# Patient Record
Sex: FEMALE | Race: WHITE | Employment: OTHER | URBAN - METROPOLITAN AREA
[De-identification: names, ages, dates, MRNs, and addresses within clinical notes are randomized per-mention and may not be internally consistent; named-entity substitution may affect disease eponyms.]

---

## 2018-03-16 ENCOUNTER — APPOINTMENT (OUTPATIENT)
Dept: GENERAL RADIOLOGY | Age: 83
DRG: 690 | End: 2018-03-16
Attending: EMERGENCY MEDICINE
Payer: MEDICARE

## 2018-03-16 ENCOUNTER — APPOINTMENT (OUTPATIENT)
Dept: CT IMAGING | Age: 83
DRG: 690 | End: 2018-03-16
Attending: EMERGENCY MEDICINE
Payer: MEDICARE

## 2018-03-16 ENCOUNTER — HOSPITAL ENCOUNTER (INPATIENT)
Age: 83
LOS: 3 days | Discharge: HOME HEALTH | DRG: 690 | End: 2018-03-19
Attending: EMERGENCY MEDICINE | Admitting: HOSPITALIST
Payer: MEDICARE

## 2018-03-16 DIAGNOSIS — N39.0 RECURRENT URINARY TRACT INFECTION: Primary | ICD-10-CM

## 2018-03-16 PROBLEM — E86.0 DEHYDRATION: Status: ACTIVE | Noted: 2018-03-16
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
## 2018-03-16 PROBLEM — R53.81 PHYSICAL DECONDITIONING: Status: ACTIVE | Noted: 2018-03-16

## 2018-03-16 LAB
ALBUMIN SERPL-MCNC: 3.8 G/DL (ref 3.2–4.6)
ALBUMIN/GLOB SERPL: 1 {RATIO} (ref 1.2–3.5)
ALP SERPL-CCNC: 118 U/L (ref 50–136)
ALT SERPL-CCNC: 24 U/L (ref 12–65)
ANION GAP SERPL CALC-SCNC: 9 MMOL/L (ref 7–16)
APPEARANCE UR: ABNORMAL
AST SERPL-CCNC: 17 U/L (ref 15–37)
ATRIAL RATE: 77 BPM
BACTERIA URNS QL MICRO: ABNORMAL /HPF
BASOPHILS # BLD: 0 K/UL (ref 0–0.2)
BASOPHILS NFR BLD: 0 % (ref 0–2)
BILIRUB SERPL-MCNC: 0.8 MG/DL (ref 0.2–1.1)
BILIRUB UR QL: NEGATIVE
BUN SERPL-MCNC: 28 MG/DL (ref 8–23)
CALCIUM SERPL-MCNC: 9.2 MG/DL (ref 8.3–10.4)
CALCULATED P AXIS, ECG09: 59 DEGREES
CALCULATED R AXIS, ECG10: -24 DEGREES
CALCULATED T AXIS, ECG11: 57 DEGREES
CASTS URNS QL MICRO: ABNORMAL /LPF
CHLORIDE SERPL-SCNC: 104 MMOL/L (ref 98–107)
CO2 SERPL-SCNC: 27 MMOL/L (ref 21–32)
COLOR UR: YELLOW
CREAT SERPL-MCNC: 1.13 MG/DL (ref 0.6–1)
DIAGNOSIS, 93000: NORMAL
DIFFERENTIAL METHOD BLD: ABNORMAL
EOSINOPHIL # BLD: 0.2 K/UL (ref 0–0.8)
EOSINOPHIL NFR BLD: 2 % (ref 0.5–7.8)
EPI CELLS #/AREA URNS HPF: ABNORMAL /HPF
ERYTHROCYTE [DISTWIDTH] IN BLOOD BY AUTOMATED COUNT: 13.6 % (ref 11.9–14.6)
GLOBULIN SER CALC-MCNC: 3.9 G/DL (ref 2.3–3.5)
GLUCOSE SERPL-MCNC: 156 MG/DL (ref 65–100)
GLUCOSE UR STRIP.AUTO-MCNC: NEGATIVE MG/DL
HCT VFR BLD AUTO: 48 % (ref 35.8–46.3)
HGB BLD-MCNC: 16.1 G/DL (ref 11.7–15.4)
HGB UR QL STRIP: NEGATIVE
IMM GRANULOCYTES # BLD: 0 K/UL (ref 0–0.5)
IMM GRANULOCYTES NFR BLD AUTO: 0 % (ref 0–5)
KETONES UR QL STRIP.AUTO: NEGATIVE MG/DL
LEUKOCYTE ESTERASE UR QL STRIP.AUTO: ABNORMAL
LYMPHOCYTES # BLD: 2.3 K/UL (ref 0.5–4.6)
LYMPHOCYTES NFR BLD: 23 % (ref 13–44)
MCH RBC QN AUTO: 32.7 PG (ref 26.1–32.9)
MCHC RBC AUTO-ENTMCNC: 33.5 G/DL (ref 31.4–35)
MCV RBC AUTO: 97.4 FL (ref 79.6–97.8)
MONOCYTES # BLD: 0.8 K/UL (ref 0.1–1.3)
MONOCYTES NFR BLD: 8 % (ref 4–12)
NEUTS SEG # BLD: 6.8 K/UL (ref 1.7–8.2)
NEUTS SEG NFR BLD: 67 % (ref 43–78)
NITRITE UR QL STRIP.AUTO: NEGATIVE
P-R INTERVAL, ECG05: 160 MS
PH UR STRIP: 7 [PH] (ref 5–9)
PLATELET # BLD AUTO: 162 K/UL (ref 150–450)
PMV BLD AUTO: 9.5 FL (ref 10.8–14.1)
POTASSIUM SERPL-SCNC: 4.2 MMOL/L (ref 3.5–5.1)
PROT SERPL-MCNC: 7.7 G/DL (ref 6.3–8.2)
PROT UR STRIP-MCNC: NEGATIVE MG/DL
Q-T INTERVAL, ECG07: 378 MS
QRS DURATION, ECG06: 86 MS
QTC CALCULATION (BEZET), ECG08: 427 MS
RBC # BLD AUTO: 4.93 M/UL (ref 4.05–5.25)
RBC #/AREA URNS HPF: ABNORMAL /HPF
SODIUM SERPL-SCNC: 140 MMOL/L (ref 136–145)
SP GR UR REFRACTOMETRY: 1.02 (ref 1–1.02)
TROPONIN I BLD-MCNC: 0.01 NG/ML (ref 0.02–0.05)
UROBILINOGEN UR QL STRIP.AUTO: 0.2 EU/DL (ref 0.2–1)
VENTRICULAR RATE, ECG03: 77 BPM
WBC # BLD AUTO: 10.1 K/UL (ref 4.3–11.1)
WBC URNS QL MICRO: ABNORMAL /HPF

## 2018-03-16 PROCEDURE — 99285 EMERGENCY DEPT VISIT HI MDM: CPT | Performed by: EMERGENCY MEDICINE

## 2018-03-16 PROCEDURE — 85025 COMPLETE CBC W/AUTO DIFF WBC: CPT | Performed by: STUDENT IN AN ORGANIZED HEALTH CARE EDUCATION/TRAINING PROGRAM

## 2018-03-16 PROCEDURE — 74011250637 HC RX REV CODE- 250/637: Performed by: HOSPITALIST

## 2018-03-16 PROCEDURE — 74011250636 HC RX REV CODE- 250/636: Performed by: EMERGENCY MEDICINE

## 2018-03-16 PROCEDURE — 93005 ELECTROCARDIOGRAM TRACING: CPT | Performed by: EMERGENCY MEDICINE

## 2018-03-16 PROCEDURE — 81001 URINALYSIS AUTO W/SCOPE: CPT | Performed by: EMERGENCY MEDICINE

## 2018-03-16 PROCEDURE — 87086 URINE CULTURE/COLONY COUNT: CPT | Performed by: HOSPITALIST

## 2018-03-16 PROCEDURE — 70450 CT HEAD/BRAIN W/O DYE: CPT

## 2018-03-16 PROCEDURE — 74022 RADEX COMPL AQT ABD SERIES: CPT

## 2018-03-16 PROCEDURE — 74011000258 HC RX REV CODE- 258: Performed by: EMERGENCY MEDICINE

## 2018-03-16 PROCEDURE — 96365 THER/PROPH/DIAG IV INF INIT: CPT | Performed by: EMERGENCY MEDICINE

## 2018-03-16 PROCEDURE — 87040 BLOOD CULTURE FOR BACTERIA: CPT | Performed by: EMERGENCY MEDICINE

## 2018-03-16 PROCEDURE — 96361 HYDRATE IV INFUSION ADD-ON: CPT | Performed by: EMERGENCY MEDICINE

## 2018-03-16 PROCEDURE — 74011250636 HC RX REV CODE- 250/636: Performed by: HOSPITALIST

## 2018-03-16 PROCEDURE — 65270000029 HC RM PRIVATE

## 2018-03-16 PROCEDURE — 84484 ASSAY OF TROPONIN QUANT: CPT

## 2018-03-16 PROCEDURE — 80053 COMPREHEN METABOLIC PANEL: CPT | Performed by: STUDENT IN AN ORGANIZED HEALTH CARE EDUCATION/TRAINING PROGRAM

## 2018-03-16 RX ORDER — CEPHALEXIN 500 MG/1
500 CAPSULE ORAL 4 TIMES DAILY
Qty: 28 CAP | Refills: 0 | Status: SHIPPED | OUTPATIENT
Start: 2018-03-16 | End: 2018-03-19

## 2018-03-16 RX ORDER — ADHESIVE BANDAGE
30 BANDAGE TOPICAL DAILY PRN
Status: DISCONTINUED | OUTPATIENT
Start: 2018-03-16 | End: 2018-03-19 | Stop reason: HOSPADM

## 2018-03-16 RX ORDER — NALOXONE HYDROCHLORIDE 0.4 MG/ML
0.4 INJECTION, SOLUTION INTRAMUSCULAR; INTRAVENOUS; SUBCUTANEOUS AS NEEDED
Status: DISCONTINUED | OUTPATIENT
Start: 2018-03-16 | End: 2018-03-19 | Stop reason: HOSPADM

## 2018-03-16 RX ORDER — SODIUM CHLORIDE 9 MG/ML
100 INJECTION, SOLUTION INTRAVENOUS CONTINUOUS
Status: DISPENSED | OUTPATIENT
Start: 2018-03-16 | End: 2018-03-18

## 2018-03-16 RX ORDER — HYDROCODONE BITARTRATE AND ACETAMINOPHEN 5; 325 MG/1; MG/1
1 TABLET ORAL
Status: DISCONTINUED | OUTPATIENT
Start: 2018-03-16 | End: 2018-03-19 | Stop reason: HOSPADM

## 2018-03-16 RX ORDER — METOPROLOL TARTRATE 100 MG/1
100 TABLET ORAL 2 TIMES DAILY
Status: DISCONTINUED | OUTPATIENT
Start: 2018-03-16 | End: 2018-03-19 | Stop reason: HOSPADM

## 2018-03-16 RX ORDER — SODIUM CHLORIDE 0.9 % (FLUSH) 0.9 %
5-10 SYRINGE (ML) INJECTION EVERY 8 HOURS
Status: DISCONTINUED | OUTPATIENT
Start: 2018-03-16 | End: 2018-03-19 | Stop reason: HOSPADM

## 2018-03-16 RX ORDER — CIPROFLOXACIN 2 MG/ML
400 INJECTION, SOLUTION INTRAVENOUS EVERY 12 HOURS
Status: DISCONTINUED | OUTPATIENT
Start: 2018-03-16 | End: 2018-03-19

## 2018-03-16 RX ORDER — ONDANSETRON 2 MG/ML
4 INJECTION INTRAMUSCULAR; INTRAVENOUS
Status: DISCONTINUED | OUTPATIENT
Start: 2018-03-16 | End: 2018-03-19 | Stop reason: HOSPADM

## 2018-03-16 RX ORDER — ACETAMINOPHEN 325 MG/1
650 TABLET ORAL
Status: DISCONTINUED | OUTPATIENT
Start: 2018-03-16 | End: 2018-03-19 | Stop reason: HOSPADM

## 2018-03-16 RX ORDER — SODIUM CHLORIDE 0.9 % (FLUSH) 0.9 %
5-10 SYRINGE (ML) INJECTION AS NEEDED
Status: DISCONTINUED | OUTPATIENT
Start: 2018-03-16 | End: 2018-03-19 | Stop reason: HOSPADM

## 2018-03-16 RX ORDER — LABETALOL HYDROCHLORIDE 5 MG/ML
20 INJECTION, SOLUTION INTRAVENOUS
Status: DISCONTINUED | OUTPATIENT
Start: 2018-03-16 | End: 2018-03-16

## 2018-03-16 RX ORDER — PHENAZOPYRIDINE HYDROCHLORIDE 200 MG/1
200 TABLET, FILM COATED ORAL 3 TIMES DAILY
Qty: 6 TAB | Refills: 0 | Status: SHIPPED | OUTPATIENT
Start: 2018-03-16 | End: 2018-03-18

## 2018-03-16 RX ORDER — ENOXAPARIN SODIUM 100 MG/ML
30 INJECTION SUBCUTANEOUS EVERY 24 HOURS
Status: DISCONTINUED | OUTPATIENT
Start: 2018-03-16 | End: 2018-03-17

## 2018-03-16 RX ADMIN — SODIUM CHLORIDE 100 ML/HR: 900 INJECTION, SOLUTION INTRAVENOUS at 19:44

## 2018-03-16 RX ADMIN — CIPROFLOXACIN 400 MG: 2 INJECTION, SOLUTION INTRAVENOUS at 22:21

## 2018-03-16 RX ADMIN — METOPROLOL TARTRATE 100 MG: 100 TABLET ORAL at 22:45

## 2018-03-16 RX ADMIN — Medication 10 ML: at 22:48

## 2018-03-16 RX ADMIN — SODIUM CHLORIDE 500 ML: 900 INJECTION, SOLUTION INTRAVENOUS at 15:04

## 2018-03-16 RX ADMIN — CEFTRIAXONE SODIUM 1 G: 1 INJECTION, POWDER, FOR SOLUTION INTRAMUSCULAR; INTRAVENOUS at 15:05

## 2018-03-16 NOTE — ED PROVIDER NOTES
HPI:  Very pleasant 72-year-old femalehistory of hypertension, diabetes, hyperlipidemia, prior UTIs he'll complain of fatigue, not feeling well for the past week. Has not been drinking fluids very well. She feels like she is also dehydrated. Feel weak overall. Denied chest pain, shortness of breath. She has discomfort in the lower abdomen. Denies any flank pain. No blood in urine. No unilateral weakness tingling or numbness. No cough. ROS  Constitutional: No fever, no chills  Skin: no rash  Eye: No vision changes  ENMT: No sore throat  Respiratory: No shortness of breath  Cardiovascular: No chest pain  Gastrointestinal: No vomiting, no nausea, no diarrhea, + abdominal pain  : No dysuria  MSK: No back pain, no muscle pain  Neuro: No headache, no change in mental status, no numbness, no tingling, no weakness    All other review of systems positive per history of present illness and the above otherwise negative or noncontributory. Visit Vitals    /66    Pulse 84    Temp 98 °F (36.7 °C)    Resp 16    Ht 5' 5\" (1.651 m)    Wt 64.9 kg (143 lb)    SpO2 93%    BMI 23.8 kg/m2     Past Medical History:   Diagnosis Date    Arthritis     Cystocele     uterine & vaginal prolapse    Diabetes mellitus ([address]     Controlled by diet.  Glaucoma     Hypercholesterolemia     no meds    Hypertension     controlled with med    UTI (urinary tract infection)     started antibiotic 1/3/14     Past Surgical History:   Procedure Laterality Date    HX COLONOSCOPY      HX UROLOGICAL       Prior to Admission Medications   Prescriptions Last Dose Informant Patient Reported? Taking? ASCORBATE CALCIUM (VITAMIN C PO)   Yes No   Sig: Take 1,000 mg by mouth daily. CYANOCOBALAMIN, VITAMIN B-12, (VITAMIN B-12 PO)   Yes No   Sig: Take 1 Tab by mouth daily. MAGNESIUM PO   Yes No   Sig: Take 1 Tab by mouth daily. metoprolol (LOPRESSOR) 100 mg tablet   Yes No   Sig: Take 100 mg by mouth two (2) times a day. Instructed to take DOS per Anesthesia guidelines. Indications: HYPERTENSION   multivitamin (ONE A DAY) tablet   Yes No   Sig: Take 1 Tab by mouth daily. Facility-Administered Medications: None         Adult Exam   General: alert, no acute distress  Head: normocephalic, atraumatic  ENT: moist mucous membranes  Neck: supple, non-tender; full range of motion  Cardiovascular: regular rate and rhythm, normal peripheral perfusion, no edema  Respiratory: lungs are clear to auscultation; normal respirations; no wheezing, rales or rhonchi  Gastrointestinal: soft, suprapubic discomfort. Some discomfort in both left lower, right lower quadrant without focal McBurney's tenderness, negative Elena's. no rebound or guarding, no peritoneal signs, no distension  Back: non-tender, full range of motion  Musculoskeletal: normal range of motion, normal strength, no gross deformities  Neurological: alert and oriented x 4, no gross focal deficits; normal speech. No pronator drift. Normal finger-nose-finger. Strength is equal bilaterally. Cranial nerve II-12 grossly intact. When patient sat up she became dizzy. Psychiatric: cooperative; appropriate mood and affect    MDM: 1 week of not feeling well. No signs of fever. Low suspicion for appendicitis. No diarrhea that would be consistent with colitis, diverticulitis. They consulted with some mild dehydration. Elevated BUN/creatinine. Urine appeared infected. We'll give IV Rocephin. We will likely discharge home with Keflex. Recommend follow-up with urology. Low suspicion for sepsis. Not hypotensive, tachycardic, febrile. 1500 - her daughter-in-law who lives with her was happens to be a nurse is now here. Also been having dizziness. There was concern for vertigo. No facial droop. Symptoms there when she gets up and walk. Leaning toward the right side. We'll give Rocephin, IV fluids, CT scan of the head, EKG, troponin and reassess.   Patient will be signed out to Dr. Carmelo Paulino pending CT head, EKG, labs      Recent Results (from the past 12 hour(s))   CBC WITH AUTOMATED DIFF    Collection Time: 03/16/18 12:40 PM   Result Value Ref Range    WBC 10.1 4.3 - 11.1 K/uL    RBC 4.93 4.05 - 5.25 M/uL    HGB 16.1 (H) 11.7 - 15.4 g/dL    HCT 48.0 (H) 35.8 - 46.3 %    MCV 97.4 79.6 - 97.8 FL    MCH 32.7 26.1 - 32.9 PG    MCHC 33.5 31.4 - 35.0 g/dL    RDW 13.6 11.9 - 14.6 %    PLATELET 861 497 - 746 K/uL    MPV 9.5 (L) 10.8 - 14.1 FL    DF AUTOMATED      NEUTROPHILS 67 43 - 78 %    LYMPHOCYTES 23 13 - 44 %    MONOCYTES 8 4.0 - 12.0 %    EOSINOPHILS 2 0.5 - 7.8 %    BASOPHILS 0 0.0 - 2.0 %    IMMATURE GRANULOCYTES 0 0.0 - 5.0 %    ABS. NEUTROPHILS 6.8 1.7 - 8.2 K/UL    ABS. LYMPHOCYTES 2.3 0.5 - 4.6 K/UL    ABS. MONOCYTES 0.8 0.1 - 1.3 K/UL    ABS. EOSINOPHILS 0.2 0.0 - 0.8 K/UL    ABS. BASOPHILS 0.0 0.0 - 0.2 K/UL    ABS. IMM. GRANS. 0.0 0.0 - 0.5 K/UL   METABOLIC PANEL, COMPREHENSIVE    Collection Time: 03/16/18 12:40 PM   Result Value Ref Range    Sodium 140 136 - 145 mmol/L    Potassium 4.2 3.5 - 5.1 mmol/L    Chloride 104 98 - 107 mmol/L    CO2 27 21 - 32 mmol/L    Anion gap 9 7 - 16 mmol/L    Glucose 156 (H) 65 - 100 mg/dL    BUN 28 (H) 8 - 23 MG/DL    Creatinine 1.13 (H) 0.6 - 1.0 MG/DL    GFR est AA 59 (L) >60 ml/min/1.73m2    GFR est non-AA 49 (L) >60 ml/min/1.73m2    Calcium 9.2 8.3 - 10.4 MG/DL    Bilirubin, total 0.8 0.2 - 1.1 MG/DL    ALT (SGPT) 24 12 - 65 U/L    AST (SGOT) 17 15 - 37 U/L    Alk.  phosphatase 118 50 - 136 U/L    Protein, total 7.7 6.3 - 8.2 g/dL    Albumin 3.8 3.2 - 4.6 g/dL    Globulin 3.9 (H) 2.3 - 3.5 g/dL    A-G Ratio 1.0 (L) 1.2 - 3.5     URINALYSIS W/ RFLX MICROSCOPIC    Collection Time: 03/16/18  1:01 PM   Result Value Ref Range    Color YELLOW      Appearance TURBID      Specific gravity 1.020 1.001 - 1.023      pH (UA) 7.0 5.0 - 9.0      Protein NEGATIVE  NEG mg/dL    Glucose NEGATIVE  mg/dL    Ketone NEGATIVE  NEG mg/dL    Bilirubin NEGATIVE  NEG Blood NEGATIVE  NEG      Urobilinogen 0.2 0.2 - 1.0 EU/dL    Nitrites NEGATIVE  NEG      Leukocyte Esterase MODERATE (A) NEG      WBC 10-20 0 /hpf    RBC 3-5 0 /hpf    Epithelial cells 10-20 0 /hpf    Bacteria 4+ (H) 0 /hpf    Casts 10-20 0 /lpf         Dragon voice recognition software was used to create this note. Although the note has been reviewed and corrected where necessary, additional errors may have been overlooked and remain in the text.

## 2018-03-16 NOTE — H&P
HOSPITALIST H&P/CONSULT  NAME:  Jonatan Camarena   Age:  80 y.o.  :   1932   MRN:   [id number]  PCP: Papito Otto MD  Consulting MD:  Treatment Team: Attending Provider: Jaun Wilde MD; Primary Nurse: Yunior Grier RN; Primary Nurse: Dewayne Parker RN  HPI:   Patient is 80years old female with pmhx of HTN, dyslipidemia, arthritis brought in to ER in view of generalized weakness, recurrent falls and poor oral intake since past 5 days. Pt was in her usual health before this, as per the daughter, Ms. India Rivera has been getting weaker and limited in her daily activities. She also been feeling weak and had multiple falls none of which included head trauma, LOC, seizure like episodes. She does not complain of dysuria or urgency, but reports increased frequency of urination. She denies chest pain, SOB, cough, palpitations, vomiting, fever, chills, diarrhea, abdominal pain, blurry vision, vertigo, ataxia. In ER, Head CT was unremarkable, U/A was dirty with moderate leukocyte esterase with 4+ bacteria. Complete ROS done and is as stated in HPI or otherwise negative  Past Medical History:   Diagnosis Date    Arthritis     Cystocele     uterine & vaginal prolapse    Diabetes mellitus ([address]     Controlled by diet.  Glaucoma     Hypercholesterolemia     no meds    Hypertension     controlled with med    UTI (urinary tract infection)     started antibiotic 1/3/14      Past Surgical History:   Procedure Laterality Date    HX COLONOSCOPY      HX UROLOGICAL        Prior to Admission Medications   Prescriptions Last Dose Informant Patient Reported? Taking? ASCORBATE CALCIUM (VITAMIN C PO)   Yes No   Sig: Take 1,000 mg by mouth daily. CYANOCOBALAMIN, VITAMIN B-12, (VITAMIN B-12 PO)   Yes No   Sig: Take 1 Tab by mouth daily. MAGNESIUM PO   Yes No   Sig: Take 1 Tab by mouth daily. metoprolol (LOPRESSOR) 100 mg tablet   Yes No   Sig: Take 100 mg by mouth two (2) times a day. Instructed to take DOS per Anesthesia guidelines. Indications: HYPERTENSION   multivitamin (ONE A DAY) tablet   Yes No   Sig: Take 1 Tab by mouth daily. Facility-Administered Medications: None     Allergies   Allergen Reactions    Penicillins Anaphylaxis    Aleve [Naproxen Sodium] Other (comments)     Dizzy &  pt states makes her feel bad      Social History   Substance Use Topics    Smoking status: Never Smoker    Smokeless tobacco: Never Used    Alcohol use No      History reviewed. No pertinent family history. Objective:     Visit Vitals    /69    Pulse 74    Temp 98 °F (36.7 °C)    Resp 18    Ht 5' 5\" (1.651 m)    Wt 64.9 kg (143 lb)    SpO2 91%    BMI 23.8 kg/m2      Temp (24hrs), Av °F (36.7 °C), Min:98 °F (36.7 °C), Max:98 °F (36.7 °C)    Oxygen Therapy  O2 Sat (%): 91 % (18 1900)  Pulse via Oximetry: 90 beats per minute (18 1900)  O2 Device: Room air (18 1831)  Physical Exam:  General:    Alert, cooperative, NAD, appearing fatigued/weak. Head:   Normocephalic, without obvious abnormality, atraumatic. Nose:  Nares normal. No drainage or sinus tenderness. Lungs:   Clear to auscultation bilaterally. No Wheezing or Rhonchi. No rales. Heart:   Regular rate and rhythm,  no murmur, rub or gallop. Abdomen:   Soft, non-tender. Not distended. Bowel sounds normal.   Extremities: No cyanosis. No edema. No clubbing  Skin:     Texture, turgor normal. No rashes or lesions.   Not Jaundiced  Neurologic: GCS 15, no motor or sensory deficits, CN 2-12 intact, cerebellar functions intact  Psych:             AOx3, mood and effect appropriate  Lymphatics:     No LAD       Data Review:   Recent Results (from the past 24 hour(s))   CBC WITH AUTOMATED DIFF    Collection Time: 18 12:40 PM   Result Value Ref Range    WBC 10.1 4.3 - 11.1 K/uL    RBC 4.93 4.05 - 5.25 M/uL    HGB 16.1 (H) 11.7 - 15.4 g/dL    HCT 48.0 (H) 35.8 - 46.3 %    MCV 97.4 79.6 - 97.8 FL    MCH 32.7 26.1 - 32.9 PG    MCHC 33.5 31.4 - 35.0 g/dL    RDW 13.6 11.9 - 14.6 %    PLATELET 287 605 - 952 K/uL    MPV 9.5 (L) 10.8 - 14.1 FL    DF AUTOMATED      NEUTROPHILS 67 43 - 78 %    LYMPHOCYTES 23 13 - 44 %    MONOCYTES 8 4.0 - 12.0 %    EOSINOPHILS 2 0.5 - 7.8 %    BASOPHILS 0 0.0 - 2.0 %    IMMATURE GRANULOCYTES 0 0.0 - 5.0 %    ABS. NEUTROPHILS 6.8 1.7 - 8.2 K/UL    ABS. LYMPHOCYTES 2.3 0.5 - 4.6 K/UL    ABS. MONOCYTES 0.8 0.1 - 1.3 K/UL    ABS. EOSINOPHILS 0.2 0.0 - 0.8 K/UL    ABS. BASOPHILS 0.0 0.0 - 0.2 K/UL    ABS. IMM. GRANS. 0.0 0.0 - 0.5 K/UL   METABOLIC PANEL, COMPREHENSIVE    Collection Time: 03/16/18 12:40 PM   Result Value Ref Range    Sodium 140 136 - 145 mmol/L    Potassium 4.2 3.5 - 5.1 mmol/L    Chloride 104 98 - 107 mmol/L    CO2 27 21 - 32 mmol/L    Anion gap 9 7 - 16 mmol/L    Glucose 156 (H) 65 - 100 mg/dL    BUN 28 (H) 8 - 23 MG/DL    Creatinine 1.13 (H) 0.6 - 1.0 MG/DL    GFR est AA 59 (L) >60 ml/min/1.73m2    GFR est non-AA 49 (L) >60 ml/min/1.73m2    Calcium 9.2 8.3 - 10.4 MG/DL    Bilirubin, total 0.8 0.2 - 1.1 MG/DL    ALT (SGPT) 24 12 - 65 U/L    AST (SGOT) 17 15 - 37 U/L    Alk.  phosphatase 118 50 - 136 U/L    Protein, total 7.7 6.3 - 8.2 g/dL    Albumin 3.8 3.2 - 4.6 g/dL    Globulin 3.9 (H) 2.3 - 3.5 g/dL    A-G Ratio 1.0 (L) 1.2 - 3.5     URINALYSIS W/ RFLX MICROSCOPIC    Collection Time: 03/16/18  1:01 PM   Result Value Ref Range    Color YELLOW      Appearance TURBID      Specific gravity 1.020 1.001 - 1.023      pH (UA) 7.0 5.0 - 9.0      Protein NEGATIVE  NEG mg/dL    Glucose NEGATIVE  mg/dL    Ketone NEGATIVE  NEG mg/dL    Bilirubin NEGATIVE  NEG      Blood NEGATIVE  NEG      Urobilinogen 0.2 0.2 - 1.0 EU/dL    Nitrites NEGATIVE  NEG      Leukocyte Esterase MODERATE (A) NEG      WBC 10-20 0 /hpf    RBC 3-5 0 /hpf    Epithelial cells 10-20 0 /hpf    Bacteria 4+ (H) 0 /hpf    Casts 10-20 0 /lpf   EKG, 12 LEAD, INITIAL    Collection Time: 03/16/18  3:16 PM   Result Value Ref Range Ventricular Rate 77 BPM    Atrial Rate 77 BPM    P-R Interval 160 ms    QRS Duration 86 ms    Q-T Interval 378 ms    QTC Calculation (Bezet) 427 ms    Calculated P Axis 59 degrees    Calculated R Axis -24 degrees    Calculated T Axis 57 degrees    Diagnosis       !! AGE AND GENDER SPECIFIC ECG ANALYSIS !! Normal sinus rhythm  Normal ECG  No previous ECGs available  Confirmed by ST DANNA PRATT MD (), KATHIE DC ([id number]) on 3/16/2018 6:19:52 PM     POC TROPONIN-I    Collection Time: 03/16/18  3:28 PM   Result Value Ref Range    Troponin-I (POC) 0.01 (L) 0.02 - 0.05 ng/ml     Imaging /Procedures /Studies  ALL IMAGING DATA PERSONALLY REVIEWED BY ME  Abdominal Series     INDICATION:  Fatigue     A single view of the chest and 2 views of the abdomen were obtained.       IMPRESSION:  No acute findings in the chest or abdomen. Examination: CT scan of the brain without contrast.     History: dizziness, 85 years Female     Technique: 5 mm axial imaging of the brain from the posterior fossa to the  vertex. Radiation dose reduction techniques were used for this study:  Our CT  scanners use one or all of the following: Automated exposure control, adjustment  of the mA and/or kVp according to patient's size, iterative reconstruction.     Comparison:  None available     Findings: Probable mild microvascular disease. The ventricles, sulci are  age-appropriate. No intracranial hemorrhage or extra-axial collection is  identified. No evidence of acute infarct. No mass effect or midline shift is  present. Basal cisterns are intact. The visualized paranasal sinuses and  mastoid air cells are clear. The orbits, bones, and soft tissues are normal in  appearance.     IMPRESSION  IMPRESSION:  Normal unenhanced CT of the brain for age. No acute intracranial  abnormality. Assessment and Plan:      Active Hospital Problems    Diagnosis Date Noted    Dehydration 03/16/2018    UTI (urinary tract infection) 03/16/2018    Falls 03/16/2018    Physical deconditioning 03/16/2018       PLAN  · Admit to inpatient for UTI with dehydration, falls and physical deconditioning. · F/U with blood and urine culture  · Continue empiric IV Cipro q12h  · Continue IV fluids NS @ 100 cc/hr  · PT/OT eval  · PPD placed  · Mild ANGEL with creatinine of 1.13, likely due to dehydration. F/u with daily BMP. · Continue home medications as reconciled in MAR  · PRN zofran for nausea/vomiting  · PRN tylenol for mild pain/fever  · DVT prophylaxis with lovenox  · Blood pressure is optimally controlled, continue home metoprolol 100 mg BID.     Code Status: full  High risk with opioids on board    Anticipated discharge: >2 MN    Signed By: Naomi Yu MD     March 16, 2018

## 2018-03-16 NOTE — IP AVS SNAPSHOT
[address] [facility] [address] 
 
 
 [address] [facility] 
[phone] Patient: Jae Mcadams MRN: [id number] EDDIE:[birth year] About your hospitalization You were admitted on:  March 16, 2018 You last received care in the:  [facility] 6 MED SURG You were discharged on:  March 19, 2018 Why you were hospitalized Your primary diagnosis was:  Uti (Urinary Tract Infection) Your diagnoses also included:  Dehydration, Falls, Physical Deconditioning Follow-up Information Follow up With Details Comments Contact Info [address] [facility]  Physical therapy services in the home [address] [facility] [address] [facility] [address] 
[phone] Ewelina Rhodes MD On 3/21/2018 at 2:45 [place] [address] 
[phone] SAUL Cardenas On 3/26/2018 at 3:00 [address] [address] 
[phone] Your Scheduled Appointments Monday March 26, 2018  3:30 PM EDT New Patient with SAUL Cardenas [facility] Urology 50 ([facility] UROLOGY) [address]  
[phone] Discharge Orders None A check destinee indicates which time of day the medication should be taken. My Medications START taking these medications Instructions Each Dose to Equal  
 Morning Noon Evening Bedtime  
 ciprofloxacin HCl 500 mg tablet Commonly known as:  CIPRO Your next dose is: This evening Take 1 Tab by mouth every twelve (12) hours for 4 days. 500 mg CONTINUE taking these medications Instructions Each Dose to Equal  
 Morning Noon Evening Bedtime MAGNESIUM PO Your next dose is:  Tomorrow Morning Take 1 Tab by mouth daily. 1 Tab  
    
  
   
   
   
  
 metoprolol tartrate 100 mg IR tablet Commonly known as:  LOPRESSOR Your next dose is: This evening Take 100 mg by mouth two (2) times a day. Instructed to take DOS per Anesthesia guidelines. Indications: HYPERTENSION  
 100 mg  
    
  
   
   
  
   
  
 multivitamin tablet Commonly known as:  ONE A DAY Your next dose is:  Tomorrow Morning Take 1 Tab by mouth daily. 1 Tab VITAMIN B-12 PO Your next dose is:  Tomorrow Morning Take 1 Tab by mouth daily. 1 Tab VITAMIN C PO Your next dose is:  Tomorrow Morning Take 1,000 mg by mouth daily. 1000 mg ASK your doctor about these medications Instructions Each Dose to Equal  
 Morning Noon Evening Bedtime  
 phenazopyridine 200 mg tablet Commonly known as:  PYRIDIUM Ask about: Should I take this medication? Your next dose is: This afternoon Take 1 Tab by mouth three (3) times daily for 2 days. Indications: URINARY TRACT IRRITATION  
 200 mg Where to Get Your Medications Information on where to get these meds will be given to you by the nurse or doctor. ! Ask your nurse or doctor about these medications  
  ciprofloxacin HCl 500 mg tablet  
 phenazopyridine 200 mg tablet Discharge Instructions Urinary Tract Infection in Women: Care Instructions Your Care Instructions A urinary tract infection, or UTI, is a general term for an infection anywhere between the kidneys and the urethra (where urine comes out). Most UTIs are bladder infections. They often cause pain or burning when you urinate. UTIs are caused by bacteria and can be cured with antibiotics. Be sure to complete your treatment so that the infection goes away. Follow-up care is a key part of your treatment and safety. Be sure to make and go to all appointments, and call your doctor if you are having problems.  It's also a good idea to know your test results and keep a list of the medicines you take. How can you care for yourself at home? · Take your antibiotics as directed. Do not stop taking them just because you feel better. You need to take the full course of antibiotics. · Drink extra water and other fluids for the next day or two. This may help wash out the bacteria that are causing the infection. (If you have kidney, heart, or liver disease and have to limit fluids, talk with your doctor before you increase your fluid intake.) · Avoid drinks that are carbonated or have caffeine. They can irritate the bladder. · Urinate often. Try to empty your bladder each time. · To relieve pain, take a hot bath or lay a heating pad set on low over your lower belly or genital area. Never go to sleep with a heating pad in place. To prevent UTIs · Drink plenty of water each day. This helps you urinate often, which clears bacteria from your system. (If you have kidney, heart, or liver disease and have to limit fluids, talk with your doctor before you increase your fluid intake.) · Urinate when you need to. · Urinate right after you have sex. · Change sanitary pads often. · Avoid douches, bubble baths, feminine hygiene sprays, and other feminine hygiene products that have deodorants. · After going to the bathroom, wipe from front to back. When should you call for help? Call your doctor now or seek immediate medical care if: 
? · Symptoms such as fever, chills, nausea, or vomiting get worse or appear for the first time. ? · You have new pain in your back just below your rib cage. This is called flank pain. ? · There is new blood or pus in your urine. ? · You have any problems with your antibiotic medicine. ? Watch closely for changes in your health, and be sure to contact your doctor if: 
? · You are not getting better after taking an antibiotic for 2 days. ? · Your symptoms go away but then come back. Where can you learn more? Go to http://shefali-bebeto.info/. Enter S762 in the search box to learn more about \"Urinary Tract Infection in Women: Care Instructions. \" Current as of: May 12, 2017 Content Version: 11.4 © 4628-2415 [employer]. Care instructions adapted under license by [employer] (which disclaims liability or warranty for this information). If you have questions about a medical condition or this instruction, always ask your healthcare professional. [address] any warranty or liability for your use of this information. DISCHARGE SUMMARY from Nurse PATIENT INSTRUCTIONS: 
 
 
F-face looks uneven A-arms unable to move or move unevenly S-speech slurred or non-existent T-time-call 911 as soon as signs and symptoms begin-DO NOT go Back to bed or wait to see if you get better-TIME IS BRAIN. Warning Signs of HEART ATTACK Call 911 if you have these symptoms: 
? Chest discomfort. Most heart attacks involve discomfort in the center of the chest that lasts more than a few minutes, or that goes away and comes back. It can feel like uncomfortable pressure, squeezing, fullness, or pain. ? Discomfort in other areas of the upper body. Symptoms can include pain or discomfort in one or both arms, the back, neck, jaw, or stomach. ? Shortness of breath with or without chest discomfort. ? Other signs may include breaking out in a cold sweat, nausea, or lightheadedness. Don't wait more than five minutes to call [address]! Fast action can save your life.  Calling 911 is almost always the fastest way to get lifesaving treatment. Emergency Medical Services staff can begin treatment when they arrive  up to an hour sooner than if someone gets to the hospital by car. The discharge information has been reviewed with the patient. The patient verbalized understanding. Discharge medications reviewed with the patient and appropriate educational materials and side effects teaching were provided. ___________________________________________________________________________________________________________________________________ Introducing [facility] & HEALTH SERVICES! [facility] introduces [employer] patient portal. Now you can access parts of your medical record, email your doctor's office, and request medication refills online. 1. In your internet browser, go to [email or web address] 2. Click on the First Time User? Click Here link in the Sign In box. You will see the New Member Sign Up page. 3. Enter your [employer] Access Code exactly as it appears below. You will not need to use this code after youve completed the sign-up process. If you do not sign up before the expiration date, you must request a new code. · [employer] Access Code: [email or web address] Expires: 6/17/2018  8:34 AM 
 
4. Enter the last four digits of your Social Security Number (xxxx) and Date of Birth (mm/dd/yyyy) as indicated and click Submit. You will be taken to the next sign-up page. 5. Create a [employer] ID. This will be your [employer] login ID and cannot be changed, so think of one that is secure and easy to remember. 6. Create a [employer] password. You can change your password at any time. 7. Enter your Password Reset Question and Answer. This can be used at a later time if you forget your password. 8. Enter your e-mail address. You will receive e-mail notification when new information is available in [address] 9. Click Sign Up. You can now view and download portions of your medical record. 10. Click the Download Summary menu link to download a portable copy of your medical information. If you have questions, please visit the Frequently Asked Questions section of the MyChart website. Remember, [employer] is NOT to be used for urgent needs. For medical emergencies, dial 911. Now available from your iPhone and Android! Unresulted Labs-Please follow up with your PCP about these lab tests Order Current Status CULTURE, BLOOD Preliminary result CULTURE, BLOOD Preliminary result Providers Seen During Your Hospitalization Provider Specialty Primary office phone [address] [facility], MD Emergency Medicine [phone] Deepak Carbajal MD Emergency Medicine [phone] Navneet Weber MD Internal Medicine [phone] Immunizations Administered for This Admission Name Date  
 TB Skin Test (PPD) Intradermal  Deferred () Your Primary Care Physician (PCP) Primary Care Physician Office Phone Office Fax Sebastian Holden [phone] You are allergic to the following Allergen Reactions Penicillins Anaphylaxis Aleve (Naproxen Sodium) Other (comments) Dizzy &  pt states makes her feel bad Recent Documentation Height Weight BMI OB Status Smoking Status 1.651 m 64.9 kg 23.8 kg/m2 Postmenopausal Never Smoker Emergency Contacts Name Discharge Info Relation Home Work Mobile Sandra Conti  Child [2] [phone] Fátima Dill  Child [2] [phone] Patient Belongings The following personal items are in your possession at time of discharge: 
  Dental Appliances: None         Home Medications: None   Jewelry: Ring, With patient  Clothing: None    Other Valuables: None Please provide this summary of care documentation to your next provider.  
  
  
 
  
Signatures-by signing, you are acknowledging that this After Visit Summary has been reviewed with you and you have received a copy. Patient Signature:  ____________________________________________________________ Date:  ____________________________________________________________  
  
Gordo John Provider Signature:  ____________________________________________________________ Date:  ____________________________________________________________

## 2018-03-16 NOTE — PROGRESS NOTES
Initial visit to assess pt's spiritual needs. Ministry of presence & prayer to demonstrate caring & concern, convey emotional & spiritual support.      Rosie Laguerre MDiv,ThM,PhD

## 2018-03-16 NOTE — ED TRIAGE NOTES
Per family patient has been having increased generalized weakness, right upper abdomen pain that worsens with deep inspiration, dark urine, and nausea without vomiting for 3 days. Patient also reports increased shortness of breath with exertion.

## 2018-03-16 NOTE — ED NOTES
Pt back from CT and xray. Pt sitting on stretcher eating sandwich. RR even and non labored.  Pt on 02 monitor in NAD

## 2018-03-16 NOTE — Clinical Note
Complete course of antibiotic. Drink an appropriate amount of fluid. Return if you have any other concerns. Follow up with Urology.

## 2018-03-17 LAB
ANION GAP SERPL CALC-SCNC: 9 MMOL/L (ref 7–16)
BUN SERPL-MCNC: 27 MG/DL (ref 8–23)
CALCIUM SERPL-MCNC: 8 MG/DL (ref 8.3–10.4)
CHLORIDE SERPL-SCNC: 108 MMOL/L (ref 98–107)
CO2 SERPL-SCNC: 24 MMOL/L (ref 21–32)
CREAT SERPL-MCNC: 0.87 MG/DL (ref 0.6–1)
ERYTHROCYTE [DISTWIDTH] IN BLOOD BY AUTOMATED COUNT: 13.5 % (ref 11.9–14.6)
GLUCOSE SERPL-MCNC: 161 MG/DL (ref 65–100)
HCT VFR BLD AUTO: 39.3 % (ref 35.8–46.3)
HGB BLD-MCNC: 13.1 G/DL (ref 11.7–15.4)
MCH RBC QN AUTO: 32.3 PG (ref 26.1–32.9)
MCHC RBC AUTO-ENTMCNC: 33.3 G/DL (ref 31.4–35)
MCV RBC AUTO: 96.8 FL (ref 79.6–97.8)
PLATELET # BLD AUTO: 148 K/UL (ref 150–450)
PMV BLD AUTO: 9.7 FL (ref 10.8–14.1)
POTASSIUM SERPL-SCNC: 3.8 MMOL/L (ref 3.5–5.1)
RBC # BLD AUTO: 4.06 M/UL (ref 4.05–5.25)
SODIUM SERPL-SCNC: 141 MMOL/L (ref 136–145)
WBC # BLD AUTO: 6.6 K/UL (ref 4.3–11.1)

## 2018-03-17 PROCEDURE — 74011250636 HC RX REV CODE- 250/636: Performed by: HOSPITALIST

## 2018-03-17 PROCEDURE — 65270000029 HC RM PRIVATE

## 2018-03-17 PROCEDURE — 85027 COMPLETE CBC AUTOMATED: CPT | Performed by: HOSPITALIST

## 2018-03-17 PROCEDURE — 74011250637 HC RX REV CODE- 250/637: Performed by: HOSPITALIST

## 2018-03-17 PROCEDURE — 80048 BASIC METABOLIC PNL TOTAL CA: CPT | Performed by: HOSPITALIST

## 2018-03-17 PROCEDURE — 36415 COLL VENOUS BLD VENIPUNCTURE: CPT | Performed by: HOSPITALIST

## 2018-03-17 RX ORDER — ENOXAPARIN SODIUM 100 MG/ML
40 INJECTION SUBCUTANEOUS EVERY 24 HOURS
Status: DISCONTINUED | OUTPATIENT
Start: 2018-03-17 | End: 2018-03-19 | Stop reason: HOSPADM

## 2018-03-17 RX ADMIN — Medication 10 ML: at 05:26

## 2018-03-17 RX ADMIN — SODIUM CHLORIDE 100 ML/HR: 900 INJECTION, SOLUTION INTRAVENOUS at 01:50

## 2018-03-17 RX ADMIN — METOPROLOL TARTRATE 100 MG: 100 TABLET ORAL at 09:09

## 2018-03-17 RX ADMIN — CIPROFLOXACIN 400 MG: 2 INJECTION, SOLUTION INTRAVENOUS at 09:09

## 2018-03-17 RX ADMIN — CIPROFLOXACIN 400 MG: 2 INJECTION, SOLUTION INTRAVENOUS at 21:19

## 2018-03-17 RX ADMIN — METOPROLOL TARTRATE 100 MG: 100 TABLET ORAL at 17:07

## 2018-03-17 RX ADMIN — Medication 10 ML: at 21:20

## 2018-03-17 NOTE — PROGRESS NOTES
TRANSFER - IN REPORT:    Verbal report received from PIPO V[place] on Ronnie Ek  being received from ED(unit) for routine progression of care      Report consisted of patients Situation, Background, Assessment and   Recommendations(SBAR). Information from the following report(s) SBAR was reviewed with the receiving nurse. Opportunity for questions and clarification was provided. Dual skin assessment completed upon patients arrival to unit with Moustapha Chacon RN and care assumed.

## 2018-03-17 NOTE — PROGRESS NOTES
Pt family members, who pt lives with, are out of town at this time. They will return home Monday. Pt daughter in law, Jody Humphreys, can be reached at [phone]. Pt rounded on hourly this shift, c/o dizziness when first sitting up but no other complaints. All needs met at this time.

## 2018-03-17 NOTE — ED NOTES
TRANSFER - OUT REPORT:    Verbal report given to PIPO Castro(name) on Ce Tadeo  being transferred to [facility](unit) for routine progression of care       Report consisted of patients Situation, Background, Assessment and   Recommendations(SBAR). Information from the following report(s) SBAR, ED Summary and Recent Results was reviewed with the receiving nurse. Lines:   Peripheral IV 01/15/14 Right Hand (Active)       Peripheral IV 03/16/18 Left Antecubital (Active)   Site Assessment Clean, dry, & intact 3/16/2018 12:38 PM   Phlebitis Assessment 0 3/16/2018 12:38 PM   Infiltration Assessment 0 3/16/2018 12:38 PM   Dressing Status Clean, dry, & intact 3/16/2018 12:38 PM        Opportunity for questions and clarification was provided.

## 2018-03-17 NOTE — PROGRESS NOTES
Problem: Nutrition Deficit  Goal: *Optimize nutritional status  Nutrition   Received consult for Diet Education (Dr Tae Herrera)  Admission nursing malnutrition Screening Tool identified eating poorly d/t decreased appetite  Assessment:  Diet order: Cardiac  Food/Nutrition and Pertinent History: Hx of diet controlled DM. Presented with 1 week history of progressiveness weakness and 3-5 days of N/V and associated decreased po intake. Findings of UTI. Noted plan for probable discharge tomorrow. Pt reports he typically eats 2 meal or 2 meals and 1 snack per day. She just lost her appetite in the past week and was eating and drinking very little. Her appetite today is \"good compared to what it was\" and she reports she ate 50% of lunch today. Anthropometrics:Height: 5' 5\" (165.1 cm),  Weight: 64.9 kg (143 lb)-unknown source, Body mass index is 23.8 kg/(m^2). BMI class of normal weight. Macronutrient needs:  EER:  3944-4164 kcal /day (20-25 kcal/kg listed BW)  EPR:  57-68 grams protein/day (1-1.2 grams/kg IBW)  Intake/Comparative standards: No intake data available expect stated intake as 50% of 1 meal. Deferred intake assessment until more intake data available. Nutrition Diagnosis: Inadequate oral intake r/t inability to consume sufficient oral intake as evidenced by decreased intake PTA as above    Intervention:   Meals and snacks: Change to CCHO, 2 gram Na diet d/t hx of DM  Nutrition supplement therapy: Glucerna shake once a day  Education/Discharge nutrition plan: Provided patient handout: Hydration for seniors and encouraged limiting concentrated sweets.     [facility], [address], [facility] [address] [place] [address]

## 2018-03-18 LAB
ANION GAP SERPL CALC-SCNC: 10 MMOL/L (ref 7–16)
BASOPHILS # BLD: 0 K/UL (ref 0–0.2)
BASOPHILS NFR BLD: 0 % (ref 0–2)
BUN SERPL-MCNC: 19 MG/DL (ref 8–23)
CALCIUM SERPL-MCNC: 8.6 MG/DL (ref 8.3–10.4)
CHLORIDE SERPL-SCNC: 110 MMOL/L (ref 98–107)
CO2 SERPL-SCNC: 23 MMOL/L (ref 21–32)
CREAT SERPL-MCNC: 0.74 MG/DL (ref 0.6–1)
DIFFERENTIAL METHOD BLD: ABNORMAL
EOSINOPHIL # BLD: 0.3 K/UL (ref 0–0.8)
EOSINOPHIL NFR BLD: 4 % (ref 0.5–7.8)
ERYTHROCYTE [DISTWIDTH] IN BLOOD BY AUTOMATED COUNT: 13.3 % (ref 11.9–14.6)
GLUCOSE SERPL-MCNC: 121 MG/DL (ref 65–100)
HCT VFR BLD AUTO: 39.1 % (ref 35.8–46.3)
HGB BLD-MCNC: 12.8 G/DL (ref 11.7–15.4)
IMM GRANULOCYTES # BLD: 0 K/UL (ref 0–0.5)
IMM GRANULOCYTES NFR BLD AUTO: 0 % (ref 0–5)
LYMPHOCYTES # BLD: 3 K/UL (ref 0.5–4.6)
LYMPHOCYTES NFR BLD: 41 % (ref 13–44)
MAGNESIUM SERPL-MCNC: 2.2 MG/DL (ref 1.8–2.4)
MCH RBC QN AUTO: 32 PG (ref 26.1–32.9)
MCHC RBC AUTO-ENTMCNC: 32.7 G/DL (ref 31.4–35)
MCV RBC AUTO: 97.8 FL (ref 79.6–97.8)
MONOCYTES # BLD: 0.4 K/UL (ref 0.1–1.3)
MONOCYTES NFR BLD: 5 % (ref 4–12)
NEUTS SEG # BLD: 3.6 K/UL (ref 1.7–8.2)
NEUTS SEG NFR BLD: 50 % (ref 43–78)
PHOSPHATE SERPL-MCNC: 2.9 MG/DL (ref 2.3–3.7)
PLATELET # BLD AUTO: 146 K/UL (ref 150–450)
PMV BLD AUTO: 10 FL (ref 10.8–14.1)
POTASSIUM SERPL-SCNC: 4 MMOL/L (ref 3.5–5.1)
RBC # BLD AUTO: 4 M/UL (ref 4.05–5.25)
SODIUM SERPL-SCNC: 143 MMOL/L (ref 136–145)
WBC # BLD AUTO: 7.3 K/UL (ref 4.3–11.1)

## 2018-03-18 PROCEDURE — 84100 ASSAY OF PHOSPHORUS: CPT | Performed by: INTERNAL MEDICINE

## 2018-03-18 PROCEDURE — 74011250637 HC RX REV CODE- 250/637: Performed by: HOSPITALIST

## 2018-03-18 PROCEDURE — 74011250636 HC RX REV CODE- 250/636: Performed by: HOSPITALIST

## 2018-03-18 PROCEDURE — 65270000029 HC RM PRIVATE

## 2018-03-18 PROCEDURE — 36415 COLL VENOUS BLD VENIPUNCTURE: CPT | Performed by: INTERNAL MEDICINE

## 2018-03-18 PROCEDURE — 83735 ASSAY OF MAGNESIUM: CPT | Performed by: INTERNAL MEDICINE

## 2018-03-18 PROCEDURE — 85025 COMPLETE CBC W/AUTO DIFF WBC: CPT | Performed by: INTERNAL MEDICINE

## 2018-03-18 PROCEDURE — 80048 BASIC METABOLIC PNL TOTAL CA: CPT | Performed by: INTERNAL MEDICINE

## 2018-03-18 RX ADMIN — Medication 10 ML: at 05:01

## 2018-03-18 RX ADMIN — CIPROFLOXACIN 400 MG: 2 INJECTION, SOLUTION INTRAVENOUS at 22:21

## 2018-03-18 RX ADMIN — CIPROFLOXACIN 400 MG: 2 INJECTION, SOLUTION INTRAVENOUS at 09:02

## 2018-03-18 RX ADMIN — ENOXAPARIN SODIUM 40 MG: 40 INJECTION SUBCUTANEOUS at 22:21

## 2018-03-18 RX ADMIN — METOPROLOL TARTRATE 100 MG: 100 TABLET ORAL at 09:02

## 2018-03-18 RX ADMIN — Medication 10 ML: at 13:20

## 2018-03-18 RX ADMIN — Medication 10 ML: at 22:22

## 2018-03-18 RX ADMIN — METOPROLOL TARTRATE 100 MG: 100 TABLET ORAL at 17:07

## 2018-03-18 NOTE — PROGRESS NOTES
Hospitalist Progress Note    Subjective:   Daily Progress Note: 3/18/2018 10:02 AM    Ms. Irina Liang is an 79 yo WF, with a pmh of arthritis and HTN, who presented 3/16 for weakness, decreased oral intake and polyuria and is found to have a UTI for which she was started on IV cipro and /hr. She is feeling a little better from and weakness is improving. She ambulated to and from the bathroom this morning independently. PT consult pending. Urine and blood cultures with no growth to date. Family with concern over recent decline in mentation over past several months. She is able to count to 30 by 5's and can remember 3/3 objects after 90 seconds with mild prompting. Oriented to person but not to time. Took many tries and much thought to remember she was at [facility].      She denies fever, chills, sob, nausea. GD and GGD at bedside.     Current Facility-Administered Medications   Medication Dose Route Frequency    benzocaine-menthol (CEPACOL) lozenge  1 Lozenge Oral Q2H PRN    enoxaparin (LOVENOX) injection 40 mg  40 mg SubCUTAneous Q24H    metoprolol tartrate (LOPRESSOR) tablet 100 mg  100 mg Oral BID    sodium chloride (NS) flush 5-10 mL  5-10 mL IntraVENous Q8H    sodium chloride (NS) flush 5-10 mL  5-10 mL IntraVENous PRN    tuberculin injection 5 Units  5 Units IntraDERMal ONCE    acetaminophen (TYLENOL) tablet 650 mg  650 mg Oral Q6H PRN    HYDROcodone-acetaminophen (NORCO) 5-325 mg per tablet 1 Tab  1 Tab Oral Q4H PRN    naloxone (NARCAN) injection 0.4 mg  0.4 mg IntraVENous PRN    ondansetron (ZOFRAN) injection 4 mg  4 mg IntraVENous Q4H PRN    magnesium hydroxide (MILK OF MAGNESIA) 400 mg/5 mL oral suspension 30 mL  30 mL Oral DAILY PRN    0.9% sodium chloride infusion  100 mL/hr IntraVENous CONTINUOUS    ciprofloxacin (CIPRO) 400 mg IVPB (premix)  400 mg IntraVENous Q12H        Review of Systems  A comprehensive review of systems was negative except for that written in the HPI.    Objective:     Visit Vitals    /77 (BP 1 Location: Left arm, BP Patient Position: At rest)    Pulse 72    Temp 98.7 °F (37.1 °C)    Resp 20    Ht 5' 5\" (1.651 m)    Wt 64.9 kg (143 lb)    SpO2 90%    BMI 23.8 kg/m2      O2 Device: Room air    Temp (24hrs), Av.9 °F (36.6 °C), Min:97.6 °F (36.4 °C), Max:98.7 °F (37.1 °C)              General: awake, alert, oriented to place and person but not time  Eyes; non icteric, EOMI  Neck; supple  CV: RRR  Pulm; CTAB, non labored  Abd; soft, non distended, active BS  Neuro: moves all extremities without focal deficit, normal speech, no facial droop    Additional comments:I reviewed the patient's new clinical lab test results. j    Data Review    Recent Results (from the past 24 hour(s))   CBC WITH AUTOMATED DIFF    Collection Time: 18  4:52 AM   Result Value Ref Range    WBC 7.3 4.3 - 11.1 K/uL    RBC 4.00 (L) 4.05 - 5.25 M/uL    HGB 12.8 11.7 - 15.4 g/dL    HCT 39.1 35.8 - 46.3 %    MCV 97.8 79.6 - 97.8 FL    MCH 32.0 26.1 - 32.9 PG    MCHC 32.7 31.4 - 35.0 g/dL    RDW 13.3 11.9 - 14.6 %    PLATELET 534 (L) 894 - 450 K/uL    MPV 10.0 (L) 10.8 - 14.1 FL    DF AUTOMATED      NEUTROPHILS 50 43 - 78 %    LYMPHOCYTES 41 13 - 44 %    MONOCYTES 5 4.0 - 12.0 %    EOSINOPHILS 4 0.5 - 7.8 %    BASOPHILS 0 0.0 - 2.0 %    IMMATURE GRANULOCYTES 0 0.0 - 5.0 %    ABS. NEUTROPHILS 3.6 1.7 - 8.2 K/UL    ABS. LYMPHOCYTES 3.0 0.5 - 4.6 K/UL    ABS. MONOCYTES 0.4 0.1 - 1.3 K/UL    ABS. EOSINOPHILS 0.3 0.0 - 0.8 K/UL    ABS. BASOPHILS 0.0 0.0 - 0.2 K/UL    ABS. IMM.  GRANS. 0.0 0.0 - 0.5 K/UL   METABOLIC PANEL, BASIC    Collection Time: 18  4:52 AM   Result Value Ref Range    Sodium 143 136 - 145 mmol/L    Potassium 4.0 3.5 - 5.1 mmol/L    Chloride 110 (H) 98 - 107 mmol/L    CO2 23 21 - 32 mmol/L    Anion gap 10 7 - 16 mmol/L    Glucose 121 (H) 65 - 100 mg/dL    BUN 19 8 - 23 MG/DL    Creatinine 0.74 0.6 - 1.0 MG/DL    GFR est AA >60 >60 ml/min/1.73m2    GFR est non-AA >60 >60 ml/min/1.73m2    Calcium 8.6 8.3 - 10.4 MG/DL   MAGNESIUM    Collection Time: 03/18/18  4:52 AM   Result Value Ref Range    Magnesium 2.2 1.8 - 2.4 mg/dL   PHOSPHORUS    Collection Time: 03/18/18  4:52 AM   Result Value Ref Range    Phosphorus 2.9 2.3 - 3.7 MG/DL         Assessment/Plan:     Principal Problem:    UTI (urinary tract infection) (3/16/2018)    Active Problems:    Dehydration (3/16/2018)      Falls (3/16/2018)      Physical deconditioning (3/16/2018)      Continue ciprofloxacin and follow urine cultures. Stop IVFs. Po intake encouraged. PT consult today. Discussed HH versus STR at SNF with patient and she desires to go home with [facility]. Hopefully home tomorrow on oral cipro with HHPT?     Care Plan discussed with: Patient/Family and Nurse      Signed By: Jose Carlos Zaman MD     March 18, 2018

## 2018-03-18 NOTE — PROGRESS NOTES
Pt rounded on hourly this shift. Very unsteady ambulating. PT called multiple times, only answered at 0815 stating she was on the list to see today, left messages other times called. PT never came to evaluate patient. All needs met at this time.

## 2018-03-19 ENCOUNTER — HOME HEALTH ADMISSION (OUTPATIENT)
Dept: HOME HEALTH SERVICES | Facility: HOME HEALTH | Age: 83
End: 2018-03-19
Payer: MEDICARE

## 2018-03-19 VITALS
DIASTOLIC BLOOD PRESSURE: 92 MMHG | HEIGHT: 65 IN | OXYGEN SATURATION: 93 % | BODY MASS INDEX: 23.82 KG/M2 | HEART RATE: 79 BPM | RESPIRATION RATE: 18 BRPM | TEMPERATURE: 97.5 F | SYSTOLIC BLOOD PRESSURE: 173 MMHG | WEIGHT: 143 LBS

## 2018-03-19 LAB
ANION GAP SERPL CALC-SCNC: 8 MMOL/L (ref 7–16)
BACTERIA SPEC CULT: NORMAL
BUN SERPL-MCNC: 18 MG/DL (ref 8–23)
CALCIUM SERPL-MCNC: 8.7 MG/DL (ref 8.3–10.4)
CHLORIDE SERPL-SCNC: 107 MMOL/L (ref 98–107)
CO2 SERPL-SCNC: 27 MMOL/L (ref 21–32)
CREAT SERPL-MCNC: 0.84 MG/DL (ref 0.6–1)
ERYTHROCYTE [DISTWIDTH] IN BLOOD BY AUTOMATED COUNT: 13.3 % (ref 11.9–14.6)
GLUCOSE BLD STRIP.AUTO-MCNC: 137 MG/DL (ref 65–100)
GLUCOSE SERPL-MCNC: 159 MG/DL (ref 65–100)
HCT VFR BLD AUTO: 40 % (ref 35.8–46.3)
HGB BLD-MCNC: 13.6 G/DL (ref 11.7–15.4)
MCH RBC QN AUTO: 32.6 PG (ref 26.1–32.9)
MCHC RBC AUTO-ENTMCNC: 34 G/DL (ref 31.4–35)
MCV RBC AUTO: 95.9 FL (ref 79.6–97.8)
PLATELET # BLD AUTO: 155 K/UL (ref 150–450)
PMV BLD AUTO: 9.5 FL (ref 10.8–14.1)
POTASSIUM SERPL-SCNC: 3.9 MMOL/L (ref 3.5–5.1)
RBC # BLD AUTO: 4.17 M/UL (ref 4.05–5.25)
SERVICE CMNT-IMP: NORMAL
SODIUM SERPL-SCNC: 142 MMOL/L (ref 136–145)
WBC # BLD AUTO: 6.2 K/UL (ref 4.3–11.1)

## 2018-03-19 PROCEDURE — 74011250637 HC RX REV CODE- 250/637: Performed by: HOSPITALIST

## 2018-03-19 PROCEDURE — 85027 COMPLETE CBC AUTOMATED: CPT | Performed by: HOSPITALIST

## 2018-03-19 PROCEDURE — 36415 COLL VENOUS BLD VENIPUNCTURE: CPT | Performed by: HOSPITALIST

## 2018-03-19 PROCEDURE — 74011250637 HC RX REV CODE- 250/637: Performed by: INTERNAL MEDICINE

## 2018-03-19 PROCEDURE — 82962 GLUCOSE BLOOD TEST: CPT

## 2018-03-19 PROCEDURE — 97161 PT EVAL LOW COMPLEX 20 MIN: CPT

## 2018-03-19 PROCEDURE — 80048 BASIC METABOLIC PNL TOTAL CA: CPT | Performed by: HOSPITALIST

## 2018-03-19 RX ORDER — CIPROFLOXACIN 500 MG/1
500 TABLET ORAL EVERY 12 HOURS
Status: DISCONTINUED | OUTPATIENT
Start: 2018-03-19 | End: 2018-03-19 | Stop reason: HOSPADM

## 2018-03-19 RX ORDER — CIPROFLOXACIN 500 MG/1
500 TABLET ORAL EVERY 12 HOURS
Qty: 8 TAB | Refills: 0 | Status: SHIPPED | OUTPATIENT
Start: 2018-03-19 | End: 2018-03-23

## 2018-03-19 RX ADMIN — CIPROFLOXACIN 500 MG: 500 TABLET, FILM COATED ORAL at 08:33

## 2018-03-19 RX ADMIN — METOPROLOL TARTRATE 100 MG: 100 TABLET ORAL at 08:33

## 2018-03-19 RX ADMIN — Medication 10 ML: at 06:00

## 2018-03-19 NOTE — DISCHARGE SUMMARY
Physician Discharge Summary       Patient: Kaur Louise MRN: [id number]  SSN: [id number]    YOB: 1932  Age: 80 y.o. Sex: female    PCP: Lety Wray MD    Allergies: Penicillins and Aleve [naproxen sodium]    Admit date: 3/16/2018  Admitting Provider: Hilario Gastelum MD    Discharge date: 3/19/2018  Discharging Provider: Shantal Tuttle MD    * Admission Diagnoses: Physical deconditioning  UTI (urinary tract infection)  Dehydration  Falls    * Discharge Diagnoses:    Hospital Problems as of 3/19/2018  Date Reviewed: 1/15/2014          Codes Class Noted - Resolved POA    Dehydration ICD-10-CM: E86.0  ICD-9-CM: 276.51  3/16/2018 - Present Unknown        * (Principal)UTI (urinary tract infection) ICD-10-CM: N39.0  ICD-9-CM: 599.0  3/16/2018 - Present Unknown        Falls ICD-10-CM: W19. Roanne Born  ICD-9-CM: E888.9  3/16/2018 - Present Unknown        Physical deconditioning ICD-10-CM: R53.81  ICD-9-CM: 799.3  3/16/2018 - Present Unknown              * Hospital Course:     Ms. Tiffany Hall is an 79 yo WF, with a pmh of arthritis and HTN, who presented 3/16 for weakness, decreased oral intake and polyuria and is found to have a UTI for which she was started on IV cipro and /hr.  She is feeling a little better from and weakness is improving. She ambulated to and from the bathroom yesterday morning independently and is consistently improving. Urine and blood cultures with no growth to date and therefore she will be discharged on oral ciprofloxacin to complete her antibiotic course.     Family with concern over recent decline in mentation over past several months. She is able to count to 30 by 5's and can remember 3/3 objects after 90 seconds with mild prompting. Oriented to person but not to time. Took many tries and much thought to remember she was at [facility].      Ms. Tiffany Hall is now medically stable for discharge home with home health PT services.     Significant Diagnostic Studies:     Examination: CT scan of the brain without contrast.     History: dizziness, 85 years Female     Technique: 5 mm axial imaging of the brain from the posterior fossa to the  vertex. Radiation dose reduction techniques were used for this study:  Our CT  scanners use one or all of the following: Automated exposure control, adjustment  of the mA and/or kVp according to patient's size, iterative reconstruction.     Comparison:  None available     Findings: Probable mild microvascular disease. The ventricles, sulci are  age-appropriate. No intracranial hemorrhage or extra-axial collection is  identified. No evidence of acute infarct. No mass effect or midline shift is  present. Basal cisterns are intact. The visualized paranasal sinuses and  mastoid air cells are clear. The orbits, bones, and soft tissues are normal in  appearance.     IMPRESSION  IMPRESSION:  Normal unenhanced CT of the brain for age. No acute intracranial  abnormality. Discharge Exam:    General: awake, alert, oriented, cooperative  Eyes; non icteric, EOMI  Neck; supple  CV; RRR  Pulm; CTAB  Abd; soft, non tender, active BS    * Discharge Condition: stable  * Disposition: Home    Discharge Medications:  Discharge Medication List as of 3/19/2018  9:05 AM      START taking these medications    Details   ciprofloxacin HCl (CIPRO) 500 mg tablet Take 1 Tab by mouth every twelve (12) hours for 4 days. , Print, Disp-8 Tab, R-0         CONTINUE these medications which have NOT CHANGED    Details   metoprolol (LOPRESSOR) 100 mg tablet Take 100 mg by mouth two (2) times a day. Instructed to take DOS per Anesthesia guidelines. Indications: HYPERTENSION, Historical Med      multivitamin (ONE A DAY) tablet Take 1 Tab by mouth daily. , Historical Med      CYANOCOBALAMIN, VITAMIN B-12, (VITAMIN B-12 PO) Take 1 Tab by mouth daily. , Historical Med      MAGNESIUM PO Take 1 Tab by mouth daily. , Historical Med      ASCORBATE CALCIUM (VITAMIN C PO) Take 1,000 mg by mouth daily. , Historical Med         STOP taking these medications       phenazopyridine (PYRIDIUM) 200 mg tablet Comments:   Reason for Stopping:               * Follow-up Care/Patient Instructions: Activity: with assistance  Diet: regular    Follow-up Information     Follow up With Details Comments [address] [facility]  Physical therapy services in the home Jennifer [address] [facility].    Sagar Pfeiffer MD On 3/21/2018 at 2:45 [address]  [address] [facility] 34 On 3/26/2018 at 3:00 [address] [facility]          35 minutes spent in discharge planning and coordination of care.      Signed:  Kelby Shaikh MD  3/19/2018  11:01 AM

## 2018-03-19 NOTE — PROGRESS NOTES
JULIA Chopra and BSC order sent to [place]. Requested this be delivered to the home. Rainer Núñez  [facility] Phone is [phone]

## 2018-03-19 NOTE — DISCHARGE INSTRUCTIONS
Urinary Tract Infection in Women: Care Instructions  Your Care Instructions    A urinary tract infection, or UTI, is a general term for an infection anywhere between the kidneys and the urethra (where urine comes out). Most UTIs are bladder infections. They often cause pain or burning when you urinate. UTIs are caused by bacteria and can be cured with antibiotics. Be sure to complete your treatment so that the infection goes away. Follow-up care is a key part of your treatment and safety. Be sure to make and go to all appointments, and call your doctor if you are having problems. It's also a good idea to know your test results and keep a list of the medicines you take. How can you care for yourself at home? · Take your antibiotics as directed. Do not stop taking them just because you feel better. You need to take the full course of antibiotics. · Drink extra water and other fluids for the next day or two. This may help wash out the bacteria that are causing the infection. (If you have kidney, heart, or liver disease and have to limit fluids, talk with your doctor before you increase your fluid intake.)  · Avoid drinks that are carbonated or have caffeine. They can irritate the bladder. · Urinate often. Try to empty your bladder each time. · To relieve pain, take a hot bath or lay a heating pad set on low over your lower belly or genital area. Never go to sleep with a heating pad in place. To prevent UTIs  · Drink plenty of water each day. This helps you urinate often, which clears bacteria from your system. (If you have kidney, heart, or liver disease and have to limit fluids, talk with your doctor before you increase your fluid intake.)  · Urinate when you need to. · Urinate right after you have sex. · Change sanitary pads often. · Avoid douches, bubble baths, feminine hygiene sprays, and other feminine hygiene products that have deodorants.   · After going to the bathroom, wipe from front to back.  When should you call for help? Call your doctor now or seek immediate medical care if:  ? · Symptoms such as fever, chills, nausea, or vomiting get worse or appear for the first time. ? · You have new pain in your back just below your rib cage. This is called flank pain. ? · There is new blood or pus in your urine. ? · You have any problems with your antibiotic medicine. ? Watch closely for changes in your health, and be sure to contact your doctor if:  ? · You are not getting better after taking an antibiotic for 2 days. ? · Your symptoms go away but then come back. Where can you learn more? Go to http://shefali-bebeto.info/. Enter D549 in the search box to learn more about \"Urinary Tract Infection in Women: Care Instructions. \"  Current as of: May 12, 2017  Content Version: 11.4  © 7018-7086 [employer]. Care instructions adapted under license by [employer] (which disclaims liability or warranty for this information). If you have questions about a medical condition or this instruction, always ask your healthcare professional. [address] any warranty or liability for your use of this information. DISCHARGE SUMMARY from Nurse    PATIENT INSTRUCTIONS:    After general anesthesia or intravenous sedation, for 24 hours or while taking prescription Narcotics:  · Limit your activities  · Do not drive and operate hazardous machinery  · Do not make important personal or business decisions  · Do  not drink alcoholic beverages  · If you have not urinated within 8 hours after discharge, please contact your surgeon on call.     Report the following to your surgeon:  · Excessive pain, swelling, redness or odor of or around the surgical area  · Temperature over 100.5  · Nausea and vomiting lasting longer than 4 hours or if unable to take medications  · Any signs of decreased circulation or nerve impairment to extremity: change in color, persistent  numbness, tingling, coldness or increase pain  · Any questions    What to do at Home:  Recommended activity: Activity as tolerated,     If you experience any of the following symptoms fever, chills, new or unrelieved pain, persistent nausea or vomiting, or any other worrisome symptoms, please follow up with PCP. *  Please give a list of your current medications to your Primary Care Provider. *  Please update this list whenever your medications are discontinued, doses are      changed, or new medications (including over-the-counter products) are added. *  Please carry medication information at all times in case of emergency situations. These are general instructions for a healthy lifestyle:    No smoking/ No tobacco products/ Avoid exposure to second hand smoke  Surgeon General's Warning:  Quitting smoking now greatly reduces serious risk to your health. Obesity, smoking, and sedentary lifestyle greatly increases your risk for illness    A healthy diet, regular physical exercise & weight monitoring are important for maintaining a healthy lifestyle    You may be retaining fluid if you have a history of heart failure or if you experience any of the following symptoms:  Weight gain of 3 pounds or more overnight or 5 pounds in a week, increased swelling in our hands or feet or shortness of breath while lying flat in bed. Please call your doctor as soon as you notice any of these symptoms; do not wait until your next office visit. Recognize signs and symptoms of STROKE:    F-face looks uneven    A-arms unable to move or move unevenly    S-speech slurred or non-existent    T-time-call 911 as soon as signs and symptoms begin-DO NOT go       Back to bed or wait to see if you get better-TIME IS BRAIN. Warning Signs of HEART ATTACK     Call 911 if you have these symptoms:   Chest discomfort.  Most heart attacks involve discomfort in the center of the chest that lasts more than a few minutes, or that goes away and comes back. It can feel like uncomfortable pressure, squeezing, fullness, or pain.  Discomfort in other areas of the upper body. Symptoms can include pain or discomfort in one or both arms, the back, neck, jaw, or stomach.  Shortness of breath with or without chest discomfort.  Other signs may include breaking out in a cold sweat, nausea, or lightheadedness. Don't wait more than five minutes to call 911 - MINUTES MATTER! Fast action can save your life. Calling 911 is almost always the fastest way to get lifesaving treatment. Emergency Medical Services staff can begin treatment when they arrive -- up to an hour sooner than if someone gets to the hospital by car. The discharge information has been reviewed with the patient. The patient verbalized understanding. Discharge medications reviewed with the patient and appropriate educational materials and side effects teaching were provided.   ___________________________________________________________________________________________________________________________________

## 2018-03-19 NOTE — PROGRESS NOTES
Care Management Interventions  PCP Verified by CM: Yes  Transition of Care Consult (CM Consult): Home Health, Discharge [address]: Yes  Physical Therapy Consult: Yes  Current Support Network: Relative's Home  Confirm Follow Up Transport: Family  Plan discussed with Pt/Family/Caregiver: Yes  Freedom of Choice Offered: Yes  Discharge Location  Discharge Placement: Home with home health     DC to home with [facility] PT services. Patient lives with her daughter who is an RN. Parish Connolly

## 2018-03-19 NOTE — PROGRESS NOTES
Problem: Mobility Impaired (Adult and Pediatric)  Goal: *Acute Goals and Plan of Care (Insert Text)    PHYSICAL THERAPY: Initial Assessment, Discharge, Treatment Day: Day of Assessment, AM 3/19/2018  INPATIENT: Hospital Day: 4  Payor: Arsh Badillo / Plan: [address] [place] [address] HMO / Product Type: Managed Care Medicare /      NAME/AGE/GENDER: Leslie Dixon is a 80 y.o. female   PRIMARY DIAGNOSIS: Physical deconditioning  UTI (urinary tract infection)  Dehydration  Falls UTI (urinary tract infection) UTI (urinary tract infection)        ICD-10: Treatment Diagnosis:   · Generalized Muscle Weakness (M62.81)  · Difficulty in walking, Not elsewhere classified (R26.2)   Precaution/Allergies:  Penicillins and Aleve [naproxen sodium]      ASSESSMENT:     Ms. Felicitas Hayward presents supine on contact and agreeable to therapy assessment. Pt able to go from supine to sit with stand by assist. Sit to stand with stand by to contact guard assist. She stated she did not have any DME at home and did not want any. She took one short walk without an assistive device with min assist from PT and pt unsteady. Had her try a short walk with rolling walker and she did much better and less assistance needed from PT. Son present and he agreed as well. Would also worry about pt getting up in middle of night to walk to bathroom so recommend [facility] and son agrees with that as well. SW informed and she is going to place order to have delivered to the house. HHPT has also been ordered. Pt has discharge orders and discharge RN waiting on PT to finish to give instructions to patient and family. At home, pt essentially stays with her son most of the time, she still has her own home and will go to her house at times during the day. She is independent with gait and ADLs without assistive devices and still drives. She is leaving the hospital today, home health PT to follow up for continued strengthening.      This section established at most recent assessment INTERVENTIONS PLANNED: (Benefits and precautions of physical therapy have been discussed with the patient.)  1. none, pt going home today  2. Group Therapy     TREATMENT PLAN: Frequency/Duration: one time visit for assessment       RECOMMENDED REHABILITATION/EQUIPMENT: (at time of discharge pending progress): Due to the probability of continued deficits (see above) this patient will likely need continued skilled physical therapy after discharge. HHPT  Equipment:    Walkers, Type: Rolling Walker   bed side commode              HISTORY:   History of Present Injury/Illness (Reason for Referral):  PER MD NOTE: Pt was in her usual health before this, as per the daughter, Ms. Cristobal Stiles has been getting weaker and limited in her daily activities. She also been feeling weak and had multiple falls none of which included head trauma, LOC, seizure like episodes. She does not complain of dysuria or urgency, but reports increased frequency of urination. She denies chest pain, SOB, cough, palpitations, vomiting, fever, chills, diarrhea, abdominal pain, blurry vision, vertigo, ataxia. In ER, Head CT was unremarkable, U/A was dirty with moderate leukocyte esterase with 4+ bacteria. Past Medical History/Comorbidities:   Ms. Cristobal Stiles  has a past medical history of Arthritis; Cystocele; Diabetes mellitus ([address] Glaucoma; Hypercholesterolemia; Hypertension; and UTI (urinary tract infection). She also has no past medical history of Aneurysm ([address] Arrhythmia; Asthma; Autoimmune disease ([address] CAD (coronary artery disease); Cancer ([address] Chronic kidney disease; Chronic obstructive pulmonary disease ([address] Chronic pain; Coagulation disorder ([address] Difficult intubation; GERD (gastroesophageal reflux disease); Heart failure ([address] Liver disease; Malignant hyperthermia due to anesthesia; Morbid obesity ([address] Nausea & vomiting; Other ill-defined conditions(799.89);  Pseudocholinesterase deficiency; Psychiatric disorder; PUD (peptic ulcer disease); Seizures ([facility] Stroke [facility]); Thromboembolus ([facility] Thyroid disease; Unspecified adverse effect of anesthesia; or Unspecified sleep apnea. Ms. Hardeep Jeter  has a past surgical history that includes hx colonoscopy and hx urological.  Social History/Living Environment:   Home Environment: Private residence  # Steps to Enter: 0  One/Two Story Residence: One story  Living Alone: No  Support Systems: Family member(s)  Patient Expects to be Discharged to[de-identified] Private residence  Current DME Used/Available at Home: None  Tub or Shower Type: Shower  Prior Level of Function/Work/Activity:  Pt living at home, independent with gait and ADLs without assistive devices     Number of Personal Factors/Comorbidities that affect the Plan of Care: 0: LOW COMPLEXITY   EXAMINATION:   Most Recent Physical Functioning:   Gross Assessment:  AROM: Generally decreased, functional  Strength: Generally decreased, functional               Posture:  Posture (WDL): Exceptions to WDL  Posture Assessment: Forward head, Rounded shoulders  Balance:  Sitting: Intact  Standing: Impaired  Standing - Static: Good  Standing - Dynamic : Fair Bed Mobility:  Supine to Sit: Stand-by assistance; Additional time  Sit to Supine:  (pt stayed in sitting at the side of the bed)  Wheelchair Mobility:     Transfers:  Sit to Stand: Contact guard assistance  Stand to Sit: Contact guard assistance  Gait:     Base of Support: Narrowed  Speed/Hope: Pace decreased (<100 feet/min)  Step Length: Left shortened;Right shortened  Gait Abnormalities: Decreased step clearance;Shuffling gait  Distance (ft): 25 Feet (ft) (also walked 45 feet, no walker minimal assist)  Assistive Device: Walker, rolling  Ambulation - Level of Assistance: Stand-by assistance      Body Structures Involved:  1. Muscles Body Functions Affected:  1. Movement Related Activities and Participation Affected:  1. Mobility  2.  Self Care   Number of elements that affect the Plan of Care: 4+: HIGH COMPLEXITY CLINICAL PRESENTATION:   Presentation: Stable and uncomplicated: LOW COMPLEXITY   CLINICAL DECISION MAKIN[address] [place] [id number] AM-PAC 6 Clicks   Basic Mobility Inpatient Short Form  How much difficulty does the patient currently have. .. Unable A Lot A Little None   1. Turning over in bed (including adjusting bedclothes, sheets and blankets)? [] 1   [] 2   [] 3   [x] 4   2. Sitting down on and standing up from a chair with arms ( e.g., wheelchair, bedside commode, etc.)   [] 1   [] 2   [] 3   [x] 4   3. Moving from lying on back to sitting on the side of the bed? [] 1   [] 2   [] 3   [x] 4   How much help from another person does the patient currently need. .. Total A Lot A Little None   4. Moving to and from a bed to a chair (including a wheelchair)? [] 1   [] 2   [] 3   [x] 4   5. Need to walk in hospital room? [] 1   [] 2   [x] 3   [] 4   6. Climbing 3-5 steps with a railing? [] 1   [] 2   [x] 3   [] 4   © , Trustees of [address] [place] [address] [address] [address], under license to [employer]. All rights reserved      Score:  Initial: 22 Most Recent: X (Date: -- )    Interpretation of Tool:  Represents activities that are increasingly more difficult (i.e. Bed mobility, Transfers, Gait). Score 24 23 22-20 19-15 14-10 9-7 6     Modifier CH CI CJ CK CL CM CN      ?  Mobility - Walking and Moving Around:     - CURRENT STATUS: CJ - 20%-39% impaired, limited or restricted    - GOAL STATUS: CI - 1%-19% impaired, limited or restricted    - D/C STATUS:  CJ - 20%-39% impaired, limited or restricted  Payor: Alexis Guerrero / Plan: [address] [place] [address] HMO / Product Type: [employer] Medicare /         Use of outcome tool(s) and clinical judgement create a POC that gives a: Clear prediction of patient's progress: LOW COMPLEXITY            TREATMENT:   (In addition to Assessment/Re-Assessment sessions the following treatments were rendered)   Pre-treatment Symptoms/Complaints:  none  Pain: Initial:   Pain Intensity 1: 0 (at rest)  Post Session:  0/10     Assessment/Reassessment only, no treatment provided today    Braces/Orthotics/Lines/Etc:   · O2 Device: Room air  Treatment/Session Assessment:    · Response to Treatment:  Tolerated well, better balance and improved safety with walker  · Interdisciplinary Collaboration:   o Registered Nurse  · After treatment position/precautions:   o pt sitting up on side of bed to receive discharge instructions     · Recommendations/Intent for next treatment session: discharge PT at this time, pt going home. Home health PT to follow up.    Total Treatment Duration:  PT Patient Time In/Time Out  Time In: 0850  Time Out: [address], PT

## 2018-03-19 NOTE — PROGRESS NOTES
[address]  Face to Face Encounter    Patients Name: Mis Butler    YOB: 1932    Ordering Physician: Jevon Zapien    Primary Diagnosis: Physical deconditioning  UTI (urinary tract infection)  Dehydration  Falls    Date of Face to Face:   3/19/2018                                  Face to Face Encounter findings are related to primary reason for home care:   yes. 1. I certify that the patient needs intermittent care as follows: physical therapy: strengthening, transfer training, gait/stair training, balance training and pt/caregiver education    2. I certify that this patient is homebound, that is: 1) patient requires the use of a walker device, special transportation, or assistance of another to leave the home; or 2) patient's condition makes leaving the home medically contraindicated; and 3) patient has a normal inability to leave the home and leaving the home requires considerable and taxing effort. Patient may leave the home for infrequent and short duration for medical reasons, and occasional absences for non-medical reasons. Homebound status is due to the following functional limitations: Patient with strength deficits limiting the performance of all ADL's without caregiver assistance or the use of an assistive device. Patient with poor safety awareness and is at risk for falls without assistance of another person and the use of an assistive device. Patient with poor ambulation endurance limiting their safe ability to ascend/descend the required number of steps to leave the home. 3. I certify that this patient is under my care and that I, or a nurse practitioner or  [id number], or clinical nurse specialist, or certified nurse midwife, working with me, had a Face-to-Face Encounter that meets the physician Face-to-Face Encounter requirements.   The following are the clinical findings from the [address] [place] [address] encounter that support the need for skilled services and is a summary of the encounter:     See Hospital chart      Braulio Smith, LISW  3/19/2018      THE FOLLOWING TO BE COMPLETED BY THE COMMUNITY PHYSICIAN:    I concur with the findings described above from the F2F encounter that this patient is homebound and in need of a skilled service.     Certifying Physician: _____________________________________      Printed Certifying Physician Name: _____________________________________    Date: _________________

## 2018-03-20 ENCOUNTER — HOME CARE VISIT (OUTPATIENT)
Dept: SCHEDULING | Facility: HOME HEALTH | Age: 83
End: 2018-03-20
Payer: MEDICARE

## 2018-03-20 PROCEDURE — 400013 HH SOC

## 2018-03-20 PROCEDURE — 3331090002 HH PPS REVENUE DEBIT

## 2018-03-20 PROCEDURE — G0151 HHCP-SERV OF PT,EA 15 MIN: HCPCS

## 2018-03-20 PROCEDURE — 3331090001 HH PPS REVENUE CREDIT

## 2018-03-21 VITALS
SYSTOLIC BLOOD PRESSURE: 130 MMHG | TEMPERATURE: 98.4 F | RESPIRATION RATE: 18 BRPM | HEART RATE: 76 BPM | DIASTOLIC BLOOD PRESSURE: 80 MMHG

## 2018-03-21 LAB
BACTERIA SPEC CULT: NORMAL
BACTERIA SPEC CULT: NORMAL
SERVICE CMNT-IMP: NORMAL
SERVICE CMNT-IMP: NORMAL

## 2018-03-21 PROCEDURE — 3331090002 HH PPS REVENUE DEBIT

## 2018-03-21 PROCEDURE — 3331090001 HH PPS REVENUE CREDIT

## 2018-03-21 NOTE — PROGRESS NOTES
Call to [facility] to enquire why DME has not been delivered. They report they are waiting for the co-pay. I shared that the family has not heard from them Ronan Galloway at [facility] will call daughter Luisa Officer directly and follow up. Anabella Davis

## 2018-03-22 ENCOUNTER — HOME CARE VISIT (OUTPATIENT)
Dept: SCHEDULING | Facility: HOME HEALTH | Age: 83
End: 2018-03-22
Payer: MEDICARE

## 2018-03-22 VITALS
TEMPERATURE: 95.9 F | RESPIRATION RATE: 18 BRPM | DIASTOLIC BLOOD PRESSURE: 80 MMHG | HEART RATE: 74 BPM | SYSTOLIC BLOOD PRESSURE: 140 MMHG

## 2018-03-22 PROCEDURE — 3331090001 HH PPS REVENUE CREDIT

## 2018-03-22 PROCEDURE — 3331090002 HH PPS REVENUE DEBIT

## 2018-03-22 PROCEDURE — G0157 HHC PT ASSISTANT EA 15: HCPCS

## 2018-03-23 PROCEDURE — 3331090002 HH PPS REVENUE DEBIT

## 2018-03-23 PROCEDURE — 3331090001 HH PPS REVENUE CREDIT

## 2018-03-24 PROCEDURE — 3331090001 HH PPS REVENUE CREDIT

## 2018-03-24 PROCEDURE — 3331090002 HH PPS REVENUE DEBIT

## 2018-03-25 PROCEDURE — 3331090001 HH PPS REVENUE CREDIT

## 2018-03-25 PROCEDURE — 3331090002 HH PPS REVENUE DEBIT

## 2018-03-26 ENCOUNTER — HOME CARE VISIT (OUTPATIENT)
Dept: SCHEDULING | Facility: HOME HEALTH | Age: 83
End: 2018-03-26
Payer: MEDICARE

## 2018-03-26 PROBLEM — R32 URINARY INCONTINENCE: Status: ACTIVE | Noted: 2018-03-26

## 2018-03-26 PROBLEM — N39.0 RECURRENT UTI: Status: ACTIVE | Noted: 2018-03-26

## 2018-03-26 PROCEDURE — 3331090002 HH PPS REVENUE DEBIT

## 2018-03-26 PROCEDURE — G0151 HHCP-SERV OF PT,EA 15 MIN: HCPCS

## 2018-03-26 PROCEDURE — 3331090001 HH PPS REVENUE CREDIT

## 2018-03-27 VITALS
TEMPERATURE: 98.3 F | RESPIRATION RATE: 20 BRPM | HEART RATE: 68 BPM | DIASTOLIC BLOOD PRESSURE: 76 MMHG | SYSTOLIC BLOOD PRESSURE: 122 MMHG

## 2018-03-27 PROCEDURE — 3331090001 HH PPS REVENUE CREDIT

## 2018-03-27 PROCEDURE — 3331090002 HH PPS REVENUE DEBIT

## 2018-03-28 ENCOUNTER — HOME CARE VISIT (OUTPATIENT)
Dept: SCHEDULING | Facility: HOME HEALTH | Age: 83
End: 2018-03-28
Payer: MEDICARE

## 2018-03-28 VITALS
RESPIRATION RATE: 19 BRPM | HEART RATE: 76 BPM | TEMPERATURE: 97.5 F | SYSTOLIC BLOOD PRESSURE: 142 MMHG | DIASTOLIC BLOOD PRESSURE: 78 MMHG

## 2018-03-28 PROCEDURE — 3331090002 HH PPS REVENUE DEBIT

## 2018-03-28 PROCEDURE — G0157 HHC PT ASSISTANT EA 15: HCPCS

## 2018-03-28 PROCEDURE — 3331090001 HH PPS REVENUE CREDIT

## 2018-03-29 PROCEDURE — 3331090001 HH PPS REVENUE CREDIT

## 2018-03-29 PROCEDURE — 3331090002 HH PPS REVENUE DEBIT

## 2018-03-30 PROCEDURE — 3331090001 HH PPS REVENUE CREDIT

## 2018-03-30 PROCEDURE — 3331090002 HH PPS REVENUE DEBIT

## 2018-03-31 PROCEDURE — 3331090001 HH PPS REVENUE CREDIT

## 2018-03-31 PROCEDURE — 3331090002 HH PPS REVENUE DEBIT

## 2018-04-01 PROCEDURE — 3331090001 HH PPS REVENUE CREDIT

## 2018-04-01 PROCEDURE — 3331090002 HH PPS REVENUE DEBIT

## 2018-04-02 PROCEDURE — 3331090002 HH PPS REVENUE DEBIT

## 2018-04-02 PROCEDURE — 3331090001 HH PPS REVENUE CREDIT

## 2018-04-03 PROCEDURE — 3331090001 HH PPS REVENUE CREDIT

## 2018-04-03 PROCEDURE — 3331090002 HH PPS REVENUE DEBIT

## 2018-04-04 PROCEDURE — 3331090001 HH PPS REVENUE CREDIT

## 2018-04-04 PROCEDURE — 3331090002 HH PPS REVENUE DEBIT

## 2018-04-05 ENCOUNTER — HOME CARE VISIT (OUTPATIENT)
Dept: SCHEDULING | Facility: HOME HEALTH | Age: 83
End: 2018-04-05
Payer: MEDICARE

## 2018-04-05 PROCEDURE — 3331090002 HH PPS REVENUE DEBIT

## 2018-04-05 PROCEDURE — 3331090001 HH PPS REVENUE CREDIT

## 2018-04-05 PROCEDURE — G0151 HHCP-SERV OF PT,EA 15 MIN: HCPCS

## 2018-04-06 VITALS
RESPIRATION RATE: 24 BRPM | TEMPERATURE: 97.8 F | DIASTOLIC BLOOD PRESSURE: 84 MMHG | SYSTOLIC BLOOD PRESSURE: 142 MMHG | OXYGEN SATURATION: 97 % | HEART RATE: 74 BPM

## 2018-04-06 PROCEDURE — 3331090001 HH PPS REVENUE CREDIT

## 2018-04-06 PROCEDURE — 3331090002 HH PPS REVENUE DEBIT

## 2018-04-07 PROCEDURE — 3331090003 HH PPS REVENUE ADJ

## 2018-04-07 PROCEDURE — 3331090001 HH PPS REVENUE CREDIT

## 2018-04-07 PROCEDURE — 3331090002 HH PPS REVENUE DEBIT

## 2018-04-08 PROCEDURE — 3331090002 HH PPS REVENUE DEBIT

## 2018-04-08 PROCEDURE — 3331090001 HH PPS REVENUE CREDIT

## 2018-04-09 PROCEDURE — 3331090001 HH PPS REVENUE CREDIT

## 2018-04-09 PROCEDURE — 3331090002 HH PPS REVENUE DEBIT

## 2018-04-10 PROCEDURE — 3331090002 HH PPS REVENUE DEBIT

## 2018-04-10 PROCEDURE — 3331090001 HH PPS REVENUE CREDIT

## 2018-04-11 PROCEDURE — 3331090002 HH PPS REVENUE DEBIT

## 2018-04-11 PROCEDURE — 3331090001 HH PPS REVENUE CREDIT

## 2018-04-12 PROCEDURE — 3331090002 HH PPS REVENUE DEBIT

## 2018-04-12 PROCEDURE — 3331090001 HH PPS REVENUE CREDIT

## 2018-04-13 PROCEDURE — 3331090002 HH PPS REVENUE DEBIT

## 2018-04-13 PROCEDURE — 3331090001 HH PPS REVENUE CREDIT

## 2018-08-06 ENCOUNTER — HOSPITAL ENCOUNTER (OUTPATIENT)
Dept: GENERAL RADIOLOGY | Age: 83
Discharge: HOME | End: 2018-08-06
Payer: MEDICARE

## 2018-08-06 DIAGNOSIS — M25.552 LEFT HIP PAIN: ICD-10-CM

## 2018-08-06 PROCEDURE — 73502 X-RAY EXAM HIP UNI 2-3 VIEWS: CPT

## 2018-08-12 ENCOUNTER — HOSPITAL ENCOUNTER (EMERGENCY)
Age: 83
Discharge: HOME | End: 2018-08-12
Attending: EMERGENCY MEDICINE
Payer: MEDICARE

## 2018-08-12 DIAGNOSIS — I46.9 CARDIAC ARREST (HCC): Primary | ICD-10-CM

## 2018-08-12 PROCEDURE — 75810000275 HC EMERGENCY DEPT VISIT NO LEVEL OF CARE: Performed by: EMERGENCY MEDICINE

## 2018-08-12 NOTE — ED TRIAGE NOTES
Per ems called out for syncopal episode, patient was eating dinner while attempting to stand \"passed out\" per ems patient's family stated found no pulse, not breathing. Just before initiating chest compressions patient became more alert (bgl [id number]). Upon arrival patient was A+Ox4 with ems with increased anxiety rr 40-50, 02 saturation 80% with non-rebreather 15l 02, sinus tach 120-130. Patient received 1mg of ativan at 1330, patient anxiety decreased, pulse decreased until pulse found at PEA, chest compression initiated 1335. OPA inserted,  Chest compression continued upon arrival. Family informed of DNR. CPR discontinued.

## 2018-08-12 NOTE — PROGRESS NOTES
Death in ER  Comforted family and staff  Will continue to provide support as needed    Ankita Vanegas, staff [address], [address]  /   [email or web address]

## 2018-08-12 NOTE — ED PROVIDER NOTES
HPI Comments: Was called up the patient for difficulty breathing. Patient was placed on a nonrebreather. EMS and was hypoxic. Was very anxious and stressed and EMS gave 1 of Ativan. During transport patient stopped breathing and became bradycardic then PEA. CPR initiated. Multiple members of this patient's family work at this hospital.  Her  Granddaughter is a tech in the to the ICU and came to the ER and prior to patient's arrival to inform us that pt was DNR and wanted tx but not intubation or CPR. I was called up to the EMS truck and found CPR in progress. I confirmed patient's name as given to me by granddaughter with EMS prior to arrival.  I then asked him to stop CPR. Pt bagged and brought into ER. She was pulseless and apneic. EMS reported no pulse for 6-7 min PTA. Granddaughter came into patient's room and all recussitory efforts were stopped. Patient is a 80 y.o. female presenting with dead on arrival. The history is provided by the EMS personnel and a relative. The history is limited by the condition of the patient. Dead On Arrival   This is a new problem. The current episode started less than 1 hour ago. The problem occurs constantly. Past Medical History:   Diagnosis Date    Arthritis     Cystocele     uterine & vaginal prolapse    Diabetes mellitus ([address]     Controlled by diet.  Glaucoma     Hypercholesterolemia     no meds    Hypertension     controlled with med    UTI (urinary tract infection)     started antibiotic 1/3/14       Past Surgical History:   Procedure Laterality Date    HX COLONOSCOPY      HX UROLOGICAL           No family history on file. Social History     Social History    Marital status:      Spouse name: N/A    Number of children: N/A    Years of education: N/A     Occupational History    Not on file. Social History Main Topics    Smoking status: Never Smoker    Smokeless tobacco: Never Used    Alcohol use No    Drug use:  No  Sexual activity: Not on file     Other Topics Concern    Not on file     Social History Narrative         ALLERGIES: Penicillins and Aleve [naproxen sodium]    Review of Systems   Unable to perform ROS: Other       Vitals:    08/12/18 1350 08/12/18 1358   Pulse: (!) 0    Resp:  (!) 0            Physical Exam   Constitutional:   Pulseless and apneic   HENT:   Head: Normocephalic and atraumatic. Eyes:   nonreactive   Cardiovascular:   pulseless   Pulmonary/Chest:   apneic   Abdominal: Soft. Musculoskeletal: She exhibits no edema or deformity. Neurological: GCS eye subscore is 1. GCS verbal subscore is 1. GCS motor subscore is 1. Skin: Skin is warm and dry. Psychiatric:   Unable to assess   Nursing note and vitals reviewed. MDM  Number of Diagnoses or Management Options  Cardiac arrest [facility]):   Diagnosis management comments: Patient DOA with a DNR and family member at bedside. No efforts to revive her were done in the emergency department. She was pronounced at 1350.   Discussed with family and  at bedside    Risk of Complications, Morbidity, and/or Mortality  Presenting problems: high  Diagnostic procedures: high  Management options: high    Patient Progress  Patient progress: other (comment) (DOA  )        ED Course       Procedures